# Patient Record
Sex: FEMALE | Race: OTHER | HISPANIC OR LATINO | URBAN - METROPOLITAN AREA
[De-identification: names, ages, dates, MRNs, and addresses within clinical notes are randomized per-mention and may not be internally consistent; named-entity substitution may affect disease eponyms.]

---

## 2017-04-05 ENCOUNTER — INPATIENT (INPATIENT)
Facility: HOSPITAL | Age: 52
LOS: 2 days | Discharge: HOME | DRG: 379 | End: 2017-04-08
Attending: INTERNAL MEDICINE | Admitting: INTERNAL MEDICINE
Payer: MEDICAID

## 2017-04-06 VITALS
TEMPERATURE: 98 F | SYSTOLIC BLOOD PRESSURE: 141 MMHG | WEIGHT: 177.91 LBS | OXYGEN SATURATION: 98 % | HEART RATE: 65 BPM | RESPIRATION RATE: 18 BRPM | DIASTOLIC BLOOD PRESSURE: 71 MMHG | HEIGHT: 65 IN

## 2017-04-06 DIAGNOSIS — K62.5 HEMORRHAGE OF ANUS AND RECTUM: ICD-10-CM

## 2017-04-06 DIAGNOSIS — Z29.9 ENCOUNTER FOR PROPHYLACTIC MEASURES, UNSPECIFIED: ICD-10-CM

## 2017-04-06 DIAGNOSIS — I10 ESSENTIAL (PRIMARY) HYPERTENSION: ICD-10-CM

## 2017-04-06 DIAGNOSIS — E11.9 TYPE 2 DIABETES MELLITUS WITHOUT COMPLICATIONS: ICD-10-CM

## 2017-04-06 LAB
ALBUMIN SERPL ELPH-MCNC: 3.2 G/DL — LOW (ref 3.5–5)
ALP SERPL-CCNC: 89 U/L — SIGNIFICANT CHANGE UP (ref 40–120)
ALT FLD-CCNC: 40 U/L DA — SIGNIFICANT CHANGE UP (ref 10–60)
ANION GAP SERPL CALC-SCNC: 4 MMOL/L — LOW (ref 5–17)
AST SERPL-CCNC: 19 U/L — SIGNIFICANT CHANGE UP (ref 10–40)
BASOPHILS # BLD AUTO: 0.1 K/UL — SIGNIFICANT CHANGE UP (ref 0–0.2)
BASOPHILS NFR BLD AUTO: 1 % — SIGNIFICANT CHANGE UP (ref 0–2)
BILIRUB SERPL-MCNC: 0.3 MG/DL — SIGNIFICANT CHANGE UP (ref 0.2–1.2)
BUN SERPL-MCNC: 13 MG/DL — SIGNIFICANT CHANGE UP (ref 7–18)
CALCIUM SERPL-MCNC: 8.7 MG/DL — SIGNIFICANT CHANGE UP (ref 8.4–10.5)
CHLORIDE SERPL-SCNC: 106 MMOL/L — SIGNIFICANT CHANGE UP (ref 96–108)
CO2 SERPL-SCNC: 30 MMOL/L — SIGNIFICANT CHANGE UP (ref 22–31)
CREAT SERPL-MCNC: 0.7 MG/DL — SIGNIFICANT CHANGE UP (ref 0.5–1.3)
EOSINOPHIL # BLD AUTO: 0.2 K/UL — SIGNIFICANT CHANGE UP (ref 0–0.5)
EOSINOPHIL NFR BLD AUTO: 1.3 % — SIGNIFICANT CHANGE UP (ref 0–6)
GLUCOSE SERPL-MCNC: 248 MG/DL — HIGH (ref 70–99)
HCT VFR BLD CALC: 36.7 % — SIGNIFICANT CHANGE UP (ref 34.5–45)
HCT VFR BLD CALC: 38.1 % — SIGNIFICANT CHANGE UP (ref 34.5–45)
HGB BLD-MCNC: 12.1 G/DL — SIGNIFICANT CHANGE UP (ref 11.5–15.5)
HGB BLD-MCNC: 13 G/DL — SIGNIFICANT CHANGE UP (ref 11.5–15.5)
LYMPHOCYTES # BLD AUTO: 4.8 K/UL — HIGH (ref 1–3.3)
LYMPHOCYTES # BLD AUTO: 42.3 % — SIGNIFICANT CHANGE UP (ref 13–44)
MCHC RBC-ENTMCNC: 29.7 PG — SIGNIFICANT CHANGE UP (ref 27–34)
MCHC RBC-ENTMCNC: 29.9 PG — SIGNIFICANT CHANGE UP (ref 27–34)
MCHC RBC-ENTMCNC: 33.1 GM/DL — SIGNIFICANT CHANGE UP (ref 32–36)
MCHC RBC-ENTMCNC: 34.1 GM/DL — SIGNIFICANT CHANGE UP (ref 32–36)
MCV RBC AUTO: 87.7 FL — SIGNIFICANT CHANGE UP (ref 80–100)
MCV RBC AUTO: 89.8 FL — SIGNIFICANT CHANGE UP (ref 80–100)
MONOCYTES # BLD AUTO: 0.7 K/UL — SIGNIFICANT CHANGE UP (ref 0–0.9)
MONOCYTES NFR BLD AUTO: 6.4 % — SIGNIFICANT CHANGE UP (ref 2–14)
NEUTROPHILS # BLD AUTO: 5.6 K/UL — SIGNIFICANT CHANGE UP (ref 1.8–7.4)
NEUTROPHILS NFR BLD AUTO: 49 % — SIGNIFICANT CHANGE UP (ref 43–77)
OB PNL STL: POSITIVE
PLATELET # BLD AUTO: 310 K/UL — SIGNIFICANT CHANGE UP (ref 150–400)
PLATELET # BLD AUTO: 318 K/UL — SIGNIFICANT CHANGE UP (ref 150–400)
POTASSIUM SERPL-MCNC: 4.4 MMOL/L — SIGNIFICANT CHANGE UP (ref 3.5–5.3)
POTASSIUM SERPL-SCNC: 4.4 MMOL/L — SIGNIFICANT CHANGE UP (ref 3.5–5.3)
PROT SERPL-MCNC: 7.7 G/DL — SIGNIFICANT CHANGE UP (ref 6–8.3)
RBC # BLD: 4.09 M/UL — SIGNIFICANT CHANGE UP (ref 3.8–5.2)
RBC # BLD: 4.34 M/UL — SIGNIFICANT CHANGE UP (ref 3.8–5.2)
RBC # FLD: 12.1 % — SIGNIFICANT CHANGE UP (ref 10.3–14.5)
RBC # FLD: 12.1 % — SIGNIFICANT CHANGE UP (ref 10.3–14.5)
SODIUM SERPL-SCNC: 140 MMOL/L — SIGNIFICANT CHANGE UP (ref 135–145)
WBC # BLD: 11.4 K/UL — HIGH (ref 3.8–10.5)
WBC # BLD: 9.1 K/UL — SIGNIFICANT CHANGE UP (ref 3.8–10.5)
WBC # FLD AUTO: 11.4 K/UL — HIGH (ref 3.8–10.5)
WBC # FLD AUTO: 9.1 K/UL — SIGNIFICANT CHANGE UP (ref 3.8–10.5)

## 2017-04-06 PROCEDURE — 88305 TISSUE EXAM BY PATHOLOGIST: CPT | Mod: 26

## 2017-04-06 PROCEDURE — 99285 EMERGENCY DEPT VISIT HI MDM: CPT | Mod: 25

## 2017-04-06 RX ORDER — DEXTROSE 50 % IN WATER 50 %
12.5 SYRINGE (ML) INTRAVENOUS ONCE
Qty: 0 | Refills: 0 | Status: DISCONTINUED | OUTPATIENT
Start: 2017-04-06 | End: 2017-04-08

## 2017-04-06 RX ORDER — INSULIN LISPRO 100/ML
VIAL (ML) SUBCUTANEOUS EVERY 6 HOURS
Qty: 0 | Refills: 0 | Status: DISCONTINUED | OUTPATIENT
Start: 2017-04-06 | End: 2017-04-08

## 2017-04-06 RX ORDER — DEXTROSE 50 % IN WATER 50 %
1 SYRINGE (ML) INTRAVENOUS ONCE
Qty: 0 | Refills: 0 | Status: DISCONTINUED | OUTPATIENT
Start: 2017-04-06 | End: 2017-04-08

## 2017-04-06 RX ORDER — SODIUM CHLORIDE 9 MG/ML
1000 INJECTION, SOLUTION INTRAVENOUS
Qty: 0 | Refills: 0 | Status: DISCONTINUED | OUTPATIENT
Start: 2017-04-06 | End: 2017-04-08

## 2017-04-06 RX ORDER — SOD SULF/SODIUM/NAHCO3/KCL/PEG
4000 SOLUTION, RECONSTITUTED, ORAL ORAL ONCE
Qty: 0 | Refills: 0 | Status: COMPLETED | OUTPATIENT
Start: 2017-04-06 | End: 2017-04-06

## 2017-04-06 RX ORDER — PANTOPRAZOLE SODIUM 20 MG/1
40 TABLET, DELAYED RELEASE ORAL ONCE
Qty: 0 | Refills: 0 | Status: COMPLETED | OUTPATIENT
Start: 2017-04-06 | End: 2017-04-06

## 2017-04-06 RX ORDER — PANTOPRAZOLE SODIUM 20 MG/1
40 TABLET, DELAYED RELEASE ORAL EVERY 12 HOURS
Qty: 0 | Refills: 0 | Status: DISCONTINUED | OUTPATIENT
Start: 2017-04-06 | End: 2017-04-08

## 2017-04-06 RX ORDER — DEXTROSE 50 % IN WATER 50 %
25 SYRINGE (ML) INTRAVENOUS ONCE
Qty: 0 | Refills: 0 | Status: DISCONTINUED | OUTPATIENT
Start: 2017-04-06 | End: 2017-04-08

## 2017-04-06 RX ORDER — SODIUM CHLORIDE 9 MG/ML
1000 INJECTION, SOLUTION INTRAVENOUS
Qty: 0 | Refills: 0 | Status: DISCONTINUED | OUTPATIENT
Start: 2017-04-06 | End: 2017-04-07

## 2017-04-06 RX ORDER — SODIUM CHLORIDE 9 MG/ML
1000 INJECTION INTRAMUSCULAR; INTRAVENOUS; SUBCUTANEOUS ONCE
Qty: 0 | Refills: 0 | Status: COMPLETED | OUTPATIENT
Start: 2017-04-06 | End: 2017-04-06

## 2017-04-06 RX ORDER — ONDANSETRON 8 MG/1
4 TABLET, FILM COATED ORAL EVERY 6 HOURS
Qty: 0 | Refills: 0 | Status: COMPLETED | OUTPATIENT
Start: 2017-04-06 | End: 2017-04-06

## 2017-04-06 RX ORDER — GLUCAGON INJECTION, SOLUTION 0.5 MG/.1ML
1 INJECTION, SOLUTION SUBCUTANEOUS ONCE
Qty: 0 | Refills: 0 | Status: DISCONTINUED | OUTPATIENT
Start: 2017-04-06 | End: 2017-04-08

## 2017-04-06 RX ORDER — SODIUM CHLORIDE 9 MG/ML
1000 INJECTION INTRAMUSCULAR; INTRAVENOUS; SUBCUTANEOUS
Qty: 0 | Refills: 0 | Status: DISCONTINUED | OUTPATIENT
Start: 2017-04-06 | End: 2017-04-06

## 2017-04-06 RX ADMIN — Medication 2: at 17:23

## 2017-04-06 RX ADMIN — SODIUM CHLORIDE 100 MILLILITER(S): 9 INJECTION INTRAMUSCULAR; INTRAVENOUS; SUBCUTANEOUS at 08:24

## 2017-04-06 RX ADMIN — PANTOPRAZOLE SODIUM 40 MILLIGRAM(S): 20 TABLET, DELAYED RELEASE ORAL at 06:05

## 2017-04-06 RX ADMIN — Medication 5: at 23:08

## 2017-04-06 RX ADMIN — SODIUM CHLORIDE 6000 MILLILITER(S): 9 INJECTION INTRAMUSCULAR; INTRAVENOUS; SUBCUTANEOUS at 06:08

## 2017-04-06 RX ADMIN — ONDANSETRON 4 MILLIGRAM(S): 8 TABLET, FILM COATED ORAL at 10:09

## 2017-04-06 RX ADMIN — PANTOPRAZOLE SODIUM 40 MILLIGRAM(S): 20 TABLET, DELAYED RELEASE ORAL at 17:23

## 2017-04-06 RX ADMIN — SODIUM CHLORIDE 100 MILLILITER(S): 9 INJECTION, SOLUTION INTRAVENOUS at 11:26

## 2017-04-06 RX ADMIN — Medication 4000 MILLILITER(S): at 10:06

## 2017-04-06 NOTE — H&P ADULT. - RS GEN PE MLT RESP DETAILS PC
breath sounds equal/airway patent/normal/respirations non-labored/good air movement/clear to auscultation bilaterally

## 2017-04-06 NOTE — H&P ADULT. - ASSESSMENT
52 y/o F from home, post menopausal (3 years ago) PMH of DM, HTN and HLD presented with acute onset of lower GI bleed. Patient was in her usual state yesterday. Ate out side and felt dizzy. Mouth was unusually dry which she attributed to DM. At 5pm she had a loose BM that was full of blood. Bleeding was associated with on and off cramping abdominal pain. She had another episode at 7pm then followed by 11 more times. She came to ED and had another 2 episodes. Still reports weakness and dizziness. No recent abdominal pain, nausea, vomiting, diarrhea or constipation prior to GI bleed.  Non smoker. Never had EGD or colonoscopy. Her mother has h/o polyps in the colon.

## 2017-04-06 NOTE — ED ADULT NURSE NOTE - OBJECTIVE STATEMENT
Pt co having rectal bleeding with bright red blood clots, and abdominal pain and nausea started today. denies vomiting, fever, chills, recent travel or on blood thinner.

## 2017-04-06 NOTE — H&P ADULT. - PROBLEM SELECTOR PLAN 1
- patient has bright red blood per rectum   - orthstatics negative  - she was hypotensive in the ED to 96/59mmhg and was given iv fluids with improvement in BP   - trended CBC; normal HB/HCT twice   - orthostatics negative   - patient to get colonoscopy today   - will continue iv protonix 40bid for now   - Dr Mcpherson, GI   - - patient has bright red blood per rectum   - orthstatics negative  - she was hypotensive in the ED to 96/59mmhg and was given iv fluids with improvement in BP   - trended CBC; normal HB/HCT twice   - orthostatics negative   - patient to get colonoscopy today   - will continue iv protonix 40bid for now   - Dr Mcpherson, GI   -ADDENDUM: patient has colonoscopy that showed polyps in the colon with a large polyp concerning for carcinoma in situ.   - will start patient on clears   - will get CT abdomen and pelvis with iv and oral contrast as per GI   - patient to f/u with GI in 3 months for repeat colonoscopy.  - BT consent to be takes once patient is out of recovery (4 hours post anaesthesia phase; however if urgently needed; daughter can be reached at [phone]) - patient has bright red blood per rectum   - orthstatics negative  - she was hypotensive in the ED to 96/59mmhg and was given iv fluids with improvement in BP   - trended CBC; normal HB/HCT twice   - orthostatics negative   - patient to get colonoscopy today   - will continue iv protonix 40bid for now   - Dr Mcpherson, GI   -ADDENDUM: patient has colonoscopy that showed polyps in the colon with a large polyp concerning for carcinoma in situ.   - will start patient on clears   - will get CT abdomen and pelvis with iv and oral contrast as per GI   - patient to f/u with GI in 3 months for repeat colonoscopy.  - BT consent to be takes once patient is out of recovery (4 hours post anaesthesia phase; however if urgently needed; daughter can be reached at [phone])  - transfuse if Hb<9

## 2017-04-06 NOTE — H&P ADULT. - HISTORY OF PRESENT ILLNESS
52 y/o F from home, post menopausal (3 years ago) PMH of DM, HTN and HLD presented with acute onset of lower GI bleed. Patient was in her usual state yesterday. Ate out side and felt dizzy. Mouth was unusually dry which she attributed to DM. At 5pm she had a loose BM that was full of blood. Bleeding was associated with on and off cramping abdominal pain. She had another episode at 7pm then followed by 11 more times. She came to ED and had another 2 episodes. Still reports weakness and dizziness. No recent abdominal pain, nausea, vomiting, diarrhea or constipation prior to GI bleed. 52 y/o F from home, post menopausal (3 years ago) PMH of DM, HTN and HLD presented with acute onset of lower GI bleed. Patient was in her usual state yesterday. Ate out side and felt dizzy. Mouth was unusually dry which she attributed to DM. At 5pm she had a loose BM that was full of blood. Bleeding was associated with on and off cramping abdominal pain. She had another episode at 7pm then followed by 11 more times. She came to ED and had another 2 episodes. Still reports weakness and dizziness. No recent abdominal pain, nausea, vomiting, diarrhea or constipation prior to GI bleed.  Non smoker. Never had EGD or colonoscopy. Her mother has h/o polyps in the colon.

## 2017-04-06 NOTE — ED PROVIDER NOTE - OBJECTIVE STATEMENT
rectal bleeding  51 year old with hx of htn and dm complaining of BRBPR 7 times tonight.  abd cramping with each BM  has not had a colonstomy.  no fever no chlls no nausea or voming,  has hs of c section, ectopic pregnancy, hernia and gastritis.  not taking any blood thinners

## 2017-04-07 LAB
ALBUMIN SERPL ELPH-MCNC: 2.9 G/DL — LOW (ref 3.5–5)
ALP SERPL-CCNC: 81 U/L — SIGNIFICANT CHANGE UP (ref 40–120)
ALT FLD-CCNC: 31 U/L DA — SIGNIFICANT CHANGE UP (ref 10–60)
ANION GAP SERPL CALC-SCNC: 9 MMOL/L — SIGNIFICANT CHANGE UP (ref 5–17)
APPEARANCE UR: CLEAR — SIGNIFICANT CHANGE UP
AST SERPL-CCNC: 17 U/L — SIGNIFICANT CHANGE UP (ref 10–40)
BASOPHILS # BLD AUTO: 0 K/UL — SIGNIFICANT CHANGE UP (ref 0–0.2)
BASOPHILS NFR BLD AUTO: 0.4 % — SIGNIFICANT CHANGE UP (ref 0–2)
BILIRUB SERPL-MCNC: 0.2 MG/DL — SIGNIFICANT CHANGE UP (ref 0.2–1.2)
BILIRUB UR-MCNC: NEGATIVE — SIGNIFICANT CHANGE UP
BUN SERPL-MCNC: 7 MG/DL — SIGNIFICANT CHANGE UP (ref 7–18)
CALCIUM SERPL-MCNC: 8 MG/DL — LOW (ref 8.4–10.5)
CHLORIDE SERPL-SCNC: 108 MMOL/L — SIGNIFICANT CHANGE UP (ref 96–108)
CO2 SERPL-SCNC: 26 MMOL/L — SIGNIFICANT CHANGE UP (ref 22–31)
COLOR SPEC: YELLOW — SIGNIFICANT CHANGE UP
CREAT SERPL-MCNC: 0.74 MG/DL — SIGNIFICANT CHANGE UP (ref 0.5–1.3)
DIFF PNL FLD: ABNORMAL
EOSINOPHIL # BLD AUTO: 0.2 K/UL — SIGNIFICANT CHANGE UP (ref 0–0.5)
EOSINOPHIL NFR BLD AUTO: 1.1 % — SIGNIFICANT CHANGE UP (ref 0–6)
FERRITIN SERPL-MCNC: 40.6 NG/ML — SIGNIFICANT CHANGE UP (ref 15–150)
GLUCOSE SERPL-MCNC: 289 MG/DL — HIGH (ref 70–99)
GLUCOSE UR QL: 250
HBA1C BLD-MCNC: 11.3 % — HIGH (ref 4–5.6)
HCT VFR BLD CALC: 33 % — LOW (ref 34.5–45)
HCT VFR BLD CALC: 33.7 % — LOW (ref 34.5–45)
HGB BLD-MCNC: 11 G/DL — LOW (ref 11.5–15.5)
HGB BLD-MCNC: 11.2 G/DL — LOW (ref 11.5–15.5)
IRON SATN MFR SERPL: 14 % — LOW (ref 15–50)
IRON SATN MFR SERPL: 37 UG/DL — LOW (ref 40–150)
KETONES UR-MCNC: NEGATIVE — SIGNIFICANT CHANGE UP
LEUKOCYTE ESTERASE UR-ACNC: NEGATIVE — SIGNIFICANT CHANGE UP
LYMPHOCYTES # BLD AUTO: 22.6 % — SIGNIFICANT CHANGE UP (ref 13–44)
LYMPHOCYTES # BLD AUTO: 3.1 K/UL — SIGNIFICANT CHANGE UP (ref 1–3.3)
MAGNESIUM SERPL-MCNC: 1.6 MG/DL — LOW (ref 1.8–2.4)
MCHC RBC-ENTMCNC: 29.6 PG — SIGNIFICANT CHANGE UP (ref 27–34)
MCHC RBC-ENTMCNC: 30.1 PG — SIGNIFICANT CHANGE UP (ref 27–34)
MCHC RBC-ENTMCNC: 33.2 GM/DL — SIGNIFICANT CHANGE UP (ref 32–36)
MCHC RBC-ENTMCNC: 33.3 GM/DL — SIGNIFICANT CHANGE UP (ref 32–36)
MCV RBC AUTO: 89 FL — SIGNIFICANT CHANGE UP (ref 80–100)
MCV RBC AUTO: 90.5 FL — SIGNIFICANT CHANGE UP (ref 80–100)
MONOCYTES # BLD AUTO: 1 K/UL — HIGH (ref 0–0.9)
MONOCYTES NFR BLD AUTO: 7.1 % — SIGNIFICANT CHANGE UP (ref 2–14)
NEUTROPHILS # BLD AUTO: 9.5 K/UL — HIGH (ref 1.8–7.4)
NEUTROPHILS NFR BLD AUTO: 68.8 % — SIGNIFICANT CHANGE UP (ref 43–77)
NITRITE UR-MCNC: NEGATIVE — SIGNIFICANT CHANGE UP
PH UR: 7 — SIGNIFICANT CHANGE UP (ref 4.8–8)
PHOSPHATE SERPL-MCNC: 2.4 MG/DL — LOW (ref 2.5–4.5)
PLATELET # BLD AUTO: 275 K/UL — SIGNIFICANT CHANGE UP (ref 150–400)
PLATELET # BLD AUTO: 283 K/UL — SIGNIFICANT CHANGE UP (ref 150–400)
POTASSIUM SERPL-MCNC: 3.7 MMOL/L — SIGNIFICANT CHANGE UP (ref 3.5–5.3)
POTASSIUM SERPL-SCNC: 3.7 MMOL/L — SIGNIFICANT CHANGE UP (ref 3.5–5.3)
PROT SERPL-MCNC: 6.6 G/DL — SIGNIFICANT CHANGE UP (ref 6–8.3)
PROT UR-MCNC: NEGATIVE — SIGNIFICANT CHANGE UP
RBC # BLD: 3.64 M/UL — LOW (ref 3.8–5.2)
RBC # BLD: 3.78 M/UL — LOW (ref 3.8–5.2)
RBC # FLD: 12.2 % — SIGNIFICANT CHANGE UP (ref 10.3–14.5)
RBC # FLD: 12.2 % — SIGNIFICANT CHANGE UP (ref 10.3–14.5)
SODIUM SERPL-SCNC: 143 MMOL/L — SIGNIFICANT CHANGE UP (ref 135–145)
SP GR SPEC: 1 — LOW (ref 1.01–1.02)
TIBC SERPL-MCNC: 275 UG/DL — SIGNIFICANT CHANGE UP (ref 250–450)
UIBC SERPL-MCNC: 238 UG/DL — SIGNIFICANT CHANGE UP (ref 110–370)
UROBILINOGEN FLD QL: NEGATIVE — SIGNIFICANT CHANGE UP
WBC # BLD: 13.8 K/UL — HIGH (ref 3.8–10.5)
WBC # BLD: 16.7 K/UL — HIGH (ref 3.8–10.5)
WBC # FLD AUTO: 13.8 K/UL — HIGH (ref 3.8–10.5)
WBC # FLD AUTO: 16.7 K/UL — HIGH (ref 3.8–10.5)

## 2017-04-07 PROCEDURE — 74177 CT ABD & PELVIS W/CONTRAST: CPT | Mod: 26

## 2017-04-07 PROCEDURE — 71010: CPT | Mod: 26

## 2017-04-07 RX ORDER — INSULIN LISPRO 100/ML
3 VIAL (ML) SUBCUTANEOUS
Qty: 0 | Refills: 0 | Status: DISCONTINUED | OUTPATIENT
Start: 2017-04-07 | End: 2017-04-08

## 2017-04-07 RX ORDER — PANTOPRAZOLE SODIUM 20 MG/1
1 TABLET, DELAYED RELEASE ORAL
Qty: 30 | Refills: 0 | OUTPATIENT
Start: 2017-04-07 | End: 2017-05-07

## 2017-04-07 RX ORDER — METRONIDAZOLE 500 MG
TABLET ORAL
Qty: 0 | Refills: 0 | Status: DISCONTINUED | OUTPATIENT
Start: 2017-04-07 | End: 2017-04-08

## 2017-04-07 RX ORDER — CIPROFLOXACIN LACTATE 400MG/40ML
400 VIAL (ML) INTRAVENOUS ONCE
Qty: 0 | Refills: 0 | Status: COMPLETED | OUTPATIENT
Start: 2017-04-07 | End: 2017-04-07

## 2017-04-07 RX ORDER — METRONIDAZOLE 500 MG
500 TABLET ORAL EVERY 8 HOURS
Qty: 0 | Refills: 0 | Status: DISCONTINUED | OUTPATIENT
Start: 2017-04-08 | End: 2017-04-08

## 2017-04-07 RX ORDER — INSULIN GLARGINE 100 [IU]/ML
10 INJECTION, SOLUTION SUBCUTANEOUS AT BEDTIME
Qty: 0 | Refills: 0 | Status: DISCONTINUED | OUTPATIENT
Start: 2017-04-07 | End: 2017-04-08

## 2017-04-07 RX ORDER — LOSARTAN POTASSIUM 100 MG/1
100 TABLET, FILM COATED ORAL DAILY
Qty: 0 | Refills: 0 | Status: DISCONTINUED | OUTPATIENT
Start: 2017-04-07 | End: 2017-04-08

## 2017-04-07 RX ORDER — ACETAMINOPHEN 500 MG
650 TABLET ORAL EVERY 6 HOURS
Qty: 0 | Refills: 0 | Status: DISCONTINUED | OUTPATIENT
Start: 2017-04-07 | End: 2017-04-08

## 2017-04-07 RX ORDER — MAGNESIUM SULFATE 500 MG/ML
1 VIAL (ML) INJECTION ONCE
Qty: 0 | Refills: 0 | Status: COMPLETED | OUTPATIENT
Start: 2017-04-07 | End: 2017-04-07

## 2017-04-07 RX ORDER — METOPROLOL TARTRATE 50 MG
50 TABLET ORAL
Qty: 0 | Refills: 0 | Status: DISCONTINUED | OUTPATIENT
Start: 2017-04-07 | End: 2017-04-07

## 2017-04-07 RX ORDER — SODIUM CHLORIDE 9 MG/ML
1000 INJECTION INTRAMUSCULAR; INTRAVENOUS; SUBCUTANEOUS
Qty: 0 | Refills: 0 | Status: DISCONTINUED | OUTPATIENT
Start: 2017-04-07 | End: 2017-04-08

## 2017-04-07 RX ORDER — METRONIDAZOLE 500 MG
500 TABLET ORAL ONCE
Qty: 0 | Refills: 0 | Status: COMPLETED | OUTPATIENT
Start: 2017-04-07 | End: 2017-04-07

## 2017-04-07 RX ORDER — CIPROFLOXACIN LACTATE 400MG/40ML
VIAL (ML) INTRAVENOUS
Qty: 0 | Refills: 0 | Status: DISCONTINUED | OUTPATIENT
Start: 2017-04-07 | End: 2017-04-08

## 2017-04-07 RX ORDER — CIPROFLOXACIN LACTATE 400MG/40ML
400 VIAL (ML) INTRAVENOUS EVERY 12 HOURS
Qty: 0 | Refills: 0 | Status: DISCONTINUED | OUTPATIENT
Start: 2017-04-08 | End: 2017-04-08

## 2017-04-07 RX ORDER — SODIUM,POTASSIUM PHOSPHATES 278-250MG
1 POWDER IN PACKET (EA) ORAL ONCE
Qty: 0 | Refills: 0 | Status: COMPLETED | OUTPATIENT
Start: 2017-04-07 | End: 2017-04-07

## 2017-04-07 RX ORDER — METOPROLOL TARTRATE 50 MG
50 TABLET ORAL
Qty: 0 | Refills: 0 | Status: DISCONTINUED | OUTPATIENT
Start: 2017-04-07 | End: 2017-04-08

## 2017-04-07 RX ADMIN — Medication 3 UNIT(S): at 18:15

## 2017-04-07 RX ADMIN — Medication 650 MILLIGRAM(S): at 07:06

## 2017-04-07 RX ADMIN — Medication 1 TABLET(S): at 10:48

## 2017-04-07 RX ADMIN — Medication 100 GRAM(S): at 10:47

## 2017-04-07 RX ADMIN — Medication 3: at 06:15

## 2017-04-07 RX ADMIN — Medication 650 MILLIGRAM(S): at 06:16

## 2017-04-07 RX ADMIN — Medication 50 MILLIGRAM(S): at 10:02

## 2017-04-07 RX ADMIN — INSULIN GLARGINE 10 UNIT(S): 100 INJECTION, SOLUTION SUBCUTANEOUS at 21:51

## 2017-04-07 RX ADMIN — Medication 4: at 18:29

## 2017-04-07 RX ADMIN — PANTOPRAZOLE SODIUM 40 MILLIGRAM(S): 20 TABLET, DELAYED RELEASE ORAL at 06:15

## 2017-04-07 RX ADMIN — SODIUM CHLORIDE 100 MILLILITER(S): 9 INJECTION, SOLUTION INTRAVENOUS at 06:15

## 2017-04-07 RX ADMIN — Medication 100 MILLIGRAM(S): at 18:30

## 2017-04-07 RX ADMIN — Medication 50 MILLIGRAM(S): at 18:30

## 2017-04-07 RX ADMIN — Medication 650 MILLIGRAM(S): at 21:11

## 2017-04-07 RX ADMIN — PANTOPRAZOLE SODIUM 40 MILLIGRAM(S): 20 TABLET, DELAYED RELEASE ORAL at 18:30

## 2017-04-07 RX ADMIN — Medication 3: at 13:16

## 2017-04-07 RX ADMIN — SODIUM CHLORIDE 100 MILLILITER(S): 9 INJECTION INTRAMUSCULAR; INTRAVENOUS; SUBCUTANEOUS at 18:24

## 2017-04-07 RX ADMIN — Medication 200 MILLIGRAM(S): at 18:30

## 2017-04-07 RX ADMIN — SODIUM CHLORIDE 100 MILLILITER(S): 9 INJECTION, SOLUTION INTRAVENOUS at 03:10

## 2017-04-07 RX ADMIN — Medication 650 MILLIGRAM(S): at 21:52

## 2017-04-07 NOTE — DISCHARGE NOTE ADULT - CARE PLAN
Principal Discharge DX:	BRBPR (bright red blood per rectum)  Goal:	prevent future epsiodes  Instructions for follow-up, activity and diet:	follow up with gastroenterologist Dr. Mcpherson in 1 week for colonic polyp biopsy results  recommend repeat colonoscopy in 3 months for follow up  Secondary Diagnosis:	Type 2 diabetes mellitus with complication, without long-term current use of insulin  Instructions for follow-up, activity and diet:	on current admission your HbA1C level was 11   follow up with endocrinologist in 1 week for medications adjustment and in 3 months for repeat HbA1C  Secondary Diagnosis:	Essential hypertension  Instructions for follow-up, activity and diet:	continue with metoprolol 25mg BID  continue with losartan 100mg daily Principal Discharge DX:	BRBPR (bright red blood per rectum)  Goal:	prevent future epsiodes  Instructions for follow-up, activity and diet:	follow up with gastroenterologist Dr. Mcpherson in 1 week for colonic polyp biopsy results  recommend repeat colonoscopy in 3 months for follow up  Take 4 more days of ciprofloxacin and flagyl as post colonoscopy  Secondary Diagnosis:	Type 2 diabetes mellitus with complication, without long-term current use of insulin  Instructions for follow-up, activity and diet:	on current admission your HbA1C level was 11   follow up with endocrinologist in 1 week for medications adjustment and in 3 months for repeat HbA1C  Secondary Diagnosis:	Essential hypertension  Instructions for follow-up, activity and diet:	continue with metoprolol 25mg BID  continue with losartan 100mg daily Principal Discharge DX:	BRBPR (bright red blood per rectum)  Goal:	prevent future epsiodes  Instructions for follow-up, activity and diet:	follow up with gastroenterologist Dr. Mcpherson in 1 week for colonic polyp biopsy results  recommend repeat colonoscopy in 3 months for follow up  Take 4 more days of ciprofloxacin and flagyl as post colonoscopy prophylaxis  Secondary Diagnosis:	Type 2 diabetes mellitus with complication, without long-term current use of insulin  Instructions for follow-up, activity and diet:	on current admission your HbA1C level was 11. Medications were adjusted slightly but patient is recommended to follow up with endocrinologist within 1 week for medications adjustment and in 3 months for repeat HbA1C  Secondary Diagnosis:	Essential hypertension  Instructions for follow-up, activity and diet:	continue with metoprolol 25mg BID  continue with losartan 100mg daily

## 2017-04-07 NOTE — DISCHARGE NOTE ADULT - MEDICATION SUMMARY - MEDICATIONS TO TAKE
I will START or STAY ON the medications listed below when I get home from the hospital:    metroNIDAZOLE 500 mg oral tablet  -- 1 tab(s) by mouth every 8 hours  -- Indication: For Antibiotic prophylaxis    losartan 100 mg oral tablet  -- 1 tab(s) by mouth once a day  -- Indication: For Hypertension    Januvia 100 mg oral tablet  -- 1 tab(s) by mouth once a day  -- Indication: For Diabetes    metFORMIN 1000 mg oral tablet  -- 1 tab(s) by mouth 2 times a day  -- Check with your doctor before becoming pregnant.  Do not drink alcoholic beverages when taking this medication.  It is very important that you take or use this exactly as directed.  Do not skip doses or discontinue unless directed by your doctor.  Obtain medical advice before taking any non-prescription drugs as some may affect the action of this medication.  Take with food or milk.    -- Indication: For Diabetes    atorvastatin 20 mg oral tablet  -- 1 tab(s) by mouth once a day  -- Indication: For Diabetes    metoprolol tartrate 50 mg oral tablet  -- 1 tab(s) by mouth 2 times a day  -- Indication: For Hypertension    pantoprazole 40 mg oral delayed release tablet  -- 1 tab(s) by mouth once a day  -- Indication: For Prophylactic measure    ciprofloxacin 500 mg oral tablet  -- 1 tab(s) by mouth every 12 hours  -- Indication: For Antibiotic prophylaxis

## 2017-04-07 NOTE — DISCHARGE NOTE ADULT - SECONDARY DIAGNOSIS.
Type 2 diabetes mellitus with complication, without long-term current use of insulin Essential hypertension

## 2017-04-07 NOTE — DISCHARGE NOTE ADULT - PLAN OF CARE
prevent future epsiodes follow up with gastroenterologist Dr. Mcpherson in 1 week for colonic polyp biopsy results  recommend repeat colonoscopy in 3 months for follow up on current admission your HbA1C level was 11   follow up with endocrinologist in 1 week for medications adjustment and in 3 months for repeat HbA1C continue with metoprolol 25mg BID  continue with losartan 100mg daily follow up with gastroenterologist Dr. Mcpherson in 1 week for colonic polyp biopsy results  recommend repeat colonoscopy in 3 months for follow up  Take 4 more days of ciprofloxacin and flagyl as post colonoscopy on current admission your HbA1C level was 11. Medications were adjusted slightly but patient is recommended to follow up with endocrinologist within 1 week for medications adjustment and in 3 months for repeat HbA1C follow up with gastroenterologist Dr. Mcpherson in 1 week for colonic polyp biopsy results  recommend repeat colonoscopy in 3 months for follow up  Take 4 more days of ciprofloxacin and flagyl as post colonoscopy prophylaxis

## 2017-04-07 NOTE — DISCHARGE NOTE ADULT - HOSPITAL COURSE
52 y/o F from home, post menopausal (3 years ago) PMH of DM, HTN and HLD. Pt denies smoking or getting prior EGD or colonoscopy. Reported family hx of colonic h/o polyps in the colon (mother).Patient was admitted due to lower GI bleed, associated with on and off cramping abdominal pain. She had about 13 episodes of bleeding in her stool.  On admission pt was orthostatics negative, hypotensive in the ED to 96/59mmhg with improvement after IVF (1L bolus). 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 CT abdomen and pelvis showing fat stranding surrounding area of bx, possible inflammation from procedure On discharge patient was adviced to f/u with GI in for biopsy results in 1 week and in 3 months for repeat colonoscopy. GI consulted Dr. Mcpherson     Pt also developed leukocytosis, WBC increased from 13.9->16.7. UA negative and CXR showing no acute process. Blood Cx growing.. Repeat CBC with diff in AM showing..     Pt was found to have uncontrolled DM. Pt takes Metformin 850mg twice a day was held as patient got contrast for CT abdomen, she also takes Januvia 100mg daily BSL ranged from 200-300 over past 24hrs. Pt was started on Humalog 3U pre meal and Lantus 10U at HS HbA1C level of 11. Endocrinology consulted Dr. Guerra recommended,,.     Pt has hx of essential hypertension and was restarted on Metoprolol home dose 25mg twice a day and losartan 100mg daily with parameters 50 y/o F from home, post menopausal (3 years ago) PMH of DM, HTN and HLD. Pt denies smoking or getting prior EGD or colonoscopy. Reported family hx of colonic h/o polyps in the colon (mother).Patient was admitted due to lower GI bleed, associated with on and off cramping abdominal pain. She had about 13 episodes of bleeding in her stool.  On admission pt was orthostatics negative, hypotensive in the ED to 96/59mmhg with improvement after IVF (1L bolus). 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 CT abdomen and pelvis showing fat stranding surrounding area of bx, possible inflammation from procedure On discharge patient was adviced to f/u with GI in for biopsy results in 1 week and in 3 months for repeat colonoscopy. GI consulted Dr. Mcpherson     Pt also developed leukocytosis, WBC increased from 13.9->16.7. UA negative and CXR showing no acute process. Blood Cx growing.. Repeat CBC with diff in AM showing decreased white count likely secondary to reactive leukocytosis from procedure    Pt was found to have uncontrolled DM. Pt takes Metformin 850mg twice a day was held as patient got contrast for CT abdomen, she also takes Januvia 100mg daily BSL ranged from 200-300 over past 24hrs. Pt was started on Humalog 3U pre meal and Lantus 10U at HS HbA1C level of 11. Endocrinology consulted Dr. Guerra recommended,,.     Pt has hx of essential hypertension and was restarted on Metoprolol home dose 25mg twice a day and losartan 100mg daily with parameters 50 y/o F from home, post menopausal (3 years ago) PMH of DM, HTN and HLD. Pt denies smoking or getting prior EGD or colonoscopy. Reported family hx of colonic h/o polyps in the colon (mother).Patient was admitted due to lower GI bleed, associated with on and off cramping abdominal pain. She had about 13 episodes of bleeding in her stool.  On admission pt was orthostatics negative, hypotensive in the ED to 96/59mmhg with improvement after IVF (1L bolus). 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 CT abdomen and pelvis showing fat stranding surrounding area of bx, possible inflammation from procedure On discharge patient was adviced to f/u with GI in for biopsy results in 1 week and in 3 months for repeat colonoscopy. GI consult: Dr. Mcpherson. Patient given Ciprofloxacin and Flagyl for 5 days for post polypectomy antibiotic prophylaxis.    Pt also developed leukocytosis, WBC increased from 13.9->16.7. UA negative and CXR showing no acute process. Blood Cx growing.. Repeat CBC with diff in AM showing decreased white count likely secondary to reactive leukocytosis from procedure    Pt was found to have uncontrolled DM. Patient's diabetic medications were adjusted but patient is strongly recommended to follow up with PCP/endocrinologist for re-evaluation of diabetes and change in medication regimen.    Pt has hx of essential hypertension and was restarted on Metoprolol home dose 25mg twice a day and losartan 100mg daily with parameters

## 2017-04-07 NOTE — DISCHARGE NOTE ADULT - MEDICATION SUMMARY - MEDICATIONS TO CHANGE
I will SWITCH the dose or number of times a day I take the medications listed below when I get home from the hospital:    metFORMIN 850 mg oral tablet  -- 1 tab(s) by mouth 2 times a day

## 2017-04-07 NOTE — DISCHARGE NOTE ADULT - CARE PROVIDER_API CALL
Og Mcpherson), Internal Medicine  [id number] [place] [address]  Phone: [phone]  Fax: [phone]

## 2017-04-07 NOTE — DISCHARGE NOTE ADULT - PATIENT PORTAL LINK FT
“You can access the FollowHealth Patient Portal, offered by [facility], by registering with the following website: [email or web address]”

## 2017-04-08 VITALS
RESPIRATION RATE: 16 BRPM | DIASTOLIC BLOOD PRESSURE: 77 MMHG | TEMPERATURE: 98 F | SYSTOLIC BLOOD PRESSURE: 162 MMHG | HEART RATE: 72 BPM | OXYGEN SATURATION: 98 %

## 2017-04-08 LAB
ALBUMIN SERPL ELPH-MCNC: 2.8 G/DL — LOW (ref 3.5–5)
ALP SERPL-CCNC: 80 U/L — SIGNIFICANT CHANGE UP (ref 40–120)
ALT FLD-CCNC: 26 U/L DA — SIGNIFICANT CHANGE UP (ref 10–60)
ANION GAP SERPL CALC-SCNC: 4 MMOL/L — LOW (ref 5–17)
AST SERPL-CCNC: 12 U/L — SIGNIFICANT CHANGE UP (ref 10–40)
BASOPHILS # BLD AUTO: 0.1 K/UL — SIGNIFICANT CHANGE UP (ref 0–0.2)
BASOPHILS NFR BLD AUTO: 0.8 % — SIGNIFICANT CHANGE UP (ref 0–2)
BILIRUB SERPL-MCNC: 0.3 MG/DL — SIGNIFICANT CHANGE UP (ref 0.2–1.2)
BUN SERPL-MCNC: 7 MG/DL — SIGNIFICANT CHANGE UP (ref 7–18)
CALCIUM SERPL-MCNC: 8.3 MG/DL — LOW (ref 8.4–10.5)
CHLORIDE SERPL-SCNC: 110 MMOL/L — HIGH (ref 96–108)
CO2 SERPL-SCNC: 28 MMOL/L — SIGNIFICANT CHANGE UP (ref 22–31)
CREAT SERPL-MCNC: 0.59 MG/DL — SIGNIFICANT CHANGE UP (ref 0.5–1.3)
EOSINOPHIL # BLD AUTO: 0.2 K/UL — SIGNIFICANT CHANGE UP (ref 0–0.5)
EOSINOPHIL NFR BLD AUTO: 1.7 % — SIGNIFICANT CHANGE UP (ref 0–6)
GLUCOSE SERPL-MCNC: 279 MG/DL — HIGH (ref 70–99)
HCT VFR BLD CALC: 31.8 % — LOW (ref 34.5–45)
HGB BLD-MCNC: 10.6 G/DL — LOW (ref 11.5–15.5)
LACTATE SERPL-SCNC: 1.1 MMOL/L — SIGNIFICANT CHANGE UP (ref 0.7–2)
LYMPHOCYTES # BLD AUTO: 3.1 K/UL — SIGNIFICANT CHANGE UP (ref 1–3.3)
LYMPHOCYTES # BLD AUTO: 33.1 % — SIGNIFICANT CHANGE UP (ref 13–44)
MAGNESIUM SERPL-MCNC: 1.8 MG/DL — SIGNIFICANT CHANGE UP (ref 1.8–2.4)
MCHC RBC-ENTMCNC: 29.8 PG — SIGNIFICANT CHANGE UP (ref 27–34)
MCHC RBC-ENTMCNC: 33.4 GM/DL — SIGNIFICANT CHANGE UP (ref 32–36)
MCV RBC AUTO: 89.5 FL — SIGNIFICANT CHANGE UP (ref 80–100)
MONOCYTES # BLD AUTO: 0.6 K/UL — SIGNIFICANT CHANGE UP (ref 0–0.9)
MONOCYTES NFR BLD AUTO: 6.5 % — SIGNIFICANT CHANGE UP (ref 2–14)
NEUTROPHILS # BLD AUTO: 5.4 K/UL — SIGNIFICANT CHANGE UP (ref 1.8–7.4)
NEUTROPHILS NFR BLD AUTO: 57.9 % — SIGNIFICANT CHANGE UP (ref 43–77)
PHOSPHATE SERPL-MCNC: 2.8 MG/DL — SIGNIFICANT CHANGE UP (ref 2.5–4.5)
PLATELET # BLD AUTO: 258 K/UL — SIGNIFICANT CHANGE UP (ref 150–400)
POTASSIUM SERPL-MCNC: 3.7 MMOL/L — SIGNIFICANT CHANGE UP (ref 3.5–5.3)
POTASSIUM SERPL-SCNC: 3.7 MMOL/L — SIGNIFICANT CHANGE UP (ref 3.5–5.3)
PROT SERPL-MCNC: 7.1 G/DL — SIGNIFICANT CHANGE UP (ref 6–8.3)
RBC # BLD: 3.56 M/UL — LOW (ref 3.8–5.2)
RBC # FLD: 12.5 % — SIGNIFICANT CHANGE UP (ref 10.3–14.5)
SODIUM SERPL-SCNC: 142 MMOL/L — SIGNIFICANT CHANGE UP (ref 135–145)
WBC # BLD: 9.3 K/UL — SIGNIFICANT CHANGE UP (ref 3.8–10.5)
WBC # FLD AUTO: 9.3 K/UL — SIGNIFICANT CHANGE UP (ref 3.8–10.5)

## 2017-04-08 PROCEDURE — 74177 CT ABD & PELVIS W/CONTRAST: CPT | Mod: 26

## 2017-04-08 RX ORDER — METOPROLOL TARTRATE 50 MG
1 TABLET ORAL
Qty: 0 | Refills: 0 | COMMUNITY

## 2017-04-08 RX ORDER — CIPROFLOXACIN LACTATE 400MG/40ML
1 VIAL (ML) INTRAVENOUS
Qty: 8 | Refills: 0 | OUTPATIENT
Start: 2017-04-08 | End: 2017-04-12

## 2017-04-08 RX ORDER — METFORMIN HYDROCHLORIDE 850 MG/1
1 TABLET ORAL
Qty: 0 | Refills: 0 | COMMUNITY

## 2017-04-08 RX ORDER — SITAGLIPTIN 50 MG/1
1 TABLET, FILM COATED ORAL
Qty: 30 | Refills: 0 | OUTPATIENT
Start: 2017-04-08 | End: 2017-05-08

## 2017-04-08 RX ORDER — ATORVASTATIN CALCIUM 80 MG/1
1 TABLET, FILM COATED ORAL
Qty: 0 | Refills: 0 | COMMUNITY

## 2017-04-08 RX ORDER — METFORMIN HYDROCHLORIDE 850 MG/1
1 TABLET ORAL
Qty: 60 | Refills: 0 | OUTPATIENT
Start: 2017-04-08 | End: 2017-05-08

## 2017-04-08 RX ORDER — METRONIDAZOLE 500 MG
1 TABLET ORAL
Qty: 12 | Refills: 0 | OUTPATIENT
Start: 2017-04-08 | End: 2017-04-12

## 2017-04-08 RX ORDER — SITAGLIPTIN 50 MG/1
1 TABLET, FILM COATED ORAL
Qty: 0 | Refills: 0 | COMMUNITY

## 2017-04-08 RX ORDER — LOSARTAN POTASSIUM 100 MG/1
1 TABLET, FILM COATED ORAL
Qty: 0 | Refills: 0 | COMMUNITY

## 2017-04-08 RX ADMIN — LOSARTAN POTASSIUM 100 MILLIGRAM(S): 100 TABLET, FILM COATED ORAL at 05:38

## 2017-04-08 RX ADMIN — Medication 3: at 11:17

## 2017-04-08 RX ADMIN — Medication 200 MILLIGRAM(S): at 06:00

## 2017-04-08 RX ADMIN — Medication 3 UNIT(S): at 11:17

## 2017-04-08 RX ADMIN — Medication 50 MILLIGRAM(S): at 06:31

## 2017-04-08 RX ADMIN — Medication 3: at 06:21

## 2017-04-08 RX ADMIN — Medication 100 MILLIGRAM(S): at 06:55

## 2017-04-08 RX ADMIN — SODIUM CHLORIDE 100 MILLILITER(S): 9 INJECTION INTRAMUSCULAR; INTRAVENOUS; SUBCUTANEOUS at 00:10

## 2017-04-08 RX ADMIN — Medication 4: at 00:28

## 2017-04-08 RX ADMIN — PANTOPRAZOLE SODIUM 40 MILLIGRAM(S): 20 TABLET, DELAYED RELEASE ORAL at 05:38

## 2017-04-11 DIAGNOSIS — E11.65 TYPE 2 DIABETES MELLITUS WITH HYPERGLYCEMIA: ICD-10-CM

## 2017-04-11 DIAGNOSIS — K92.2 GASTROINTESTINAL HEMORRHAGE, UNSPECIFIED: ICD-10-CM

## 2017-04-11 DIAGNOSIS — Z88.0 ALLERGY STATUS TO PENICILLIN: ICD-10-CM

## 2017-04-11 DIAGNOSIS — I10 ESSENTIAL (PRIMARY) HYPERTENSION: ICD-10-CM

## 2017-04-11 DIAGNOSIS — E78.5 HYPERLIPIDEMIA, UNSPECIFIED: ICD-10-CM

## 2017-04-11 DIAGNOSIS — D72.829 ELEVATED WHITE BLOOD CELL COUNT, UNSPECIFIED: ICD-10-CM

## 2017-04-13 DIAGNOSIS — D12.3 BENIGN NEOPLASM OF TRANSVERSE COLON: ICD-10-CM

## 2017-04-13 DIAGNOSIS — D12.2 BENIGN NEOPLASM OF ASCENDING COLON: ICD-10-CM

## 2017-04-13 DIAGNOSIS — D12.4 BENIGN NEOPLASM OF DESCENDING COLON: ICD-10-CM

## 2017-04-13 LAB
CULTURE RESULTS: SIGNIFICANT CHANGE UP
SPECIMEN SOURCE: SIGNIFICANT CHANGE UP

## 2017-07-23 PROCEDURE — 86850 RBC ANTIBODY SCREEN: CPT

## 2017-07-23 PROCEDURE — 86901 BLOOD TYPING SEROLOGIC RH(D): CPT

## 2017-07-23 PROCEDURE — 71045 X-RAY EXAM CHEST 1 VIEW: CPT

## 2017-07-23 PROCEDURE — 88305 TISSUE EXAM BY PATHOLOGIST: CPT

## 2017-07-23 PROCEDURE — 83605 ASSAY OF LACTIC ACID: CPT

## 2017-07-23 PROCEDURE — 74177 CT ABD & PELVIS W/CONTRAST: CPT

## 2017-07-23 PROCEDURE — 83735 ASSAY OF MAGNESIUM: CPT

## 2017-07-23 PROCEDURE — 86900 BLOOD TYPING SEROLOGIC ABO: CPT

## 2017-07-23 PROCEDURE — 82272 OCCULT BLD FECES 1-3 TESTS: CPT

## 2017-07-23 PROCEDURE — 99285 EMERGENCY DEPT VISIT HI MDM: CPT | Mod: 25

## 2017-07-23 PROCEDURE — 83550 IRON BINDING TEST: CPT

## 2017-07-23 PROCEDURE — 87040 BLOOD CULTURE FOR BACTERIA: CPT

## 2017-07-23 PROCEDURE — 93005 ELECTROCARDIOGRAM TRACING: CPT

## 2017-07-23 PROCEDURE — 84100 ASSAY OF PHOSPHORUS: CPT

## 2017-07-23 PROCEDURE — 82728 ASSAY OF FERRITIN: CPT

## 2017-07-23 PROCEDURE — 81001 URINALYSIS AUTO W/SCOPE: CPT

## 2017-07-23 PROCEDURE — 85027 COMPLETE CBC AUTOMATED: CPT

## 2017-07-23 PROCEDURE — 83036 HEMOGLOBIN GLYCOSYLATED A1C: CPT

## 2017-07-23 PROCEDURE — 80053 COMPREHEN METABOLIC PANEL: CPT

## 2019-06-17 NOTE — H&P ADULT. - RESPIRATORY AND THORAX
Discharge Note  Short Stay      SUMMARY     Admit Date: 6/17/2019    Attending Physician: Chyna Valdovinos    Procedure: L5/S1 Interlaminar Epidural Steroid Injection    Discharge Physician: Chyna Valdovinos    Discharge Date: 6/17/2019 2:07 PM    Final Diagnosis: Lumbar radiculitis [M54.16]  DDD (degenerative disc disease), lumbar [M51.36]    Disposition: Home or self care    Patient Instructions:   Current Discharge Medication List      CONTINUE these medications which have NOT CHANGED    Details   multivitamin (ONE DAILY MULTIVITAMIN) per tablet Take 1 tablet by mouth once daily.      zolpidem (AMBIEN) 5 MG Tab Take 1 tablet (5 mg total) by mouth nightly as needed.  Qty: 30 tablet, Refills: 3    Associated Diagnoses: Sleep disturbance             Resume home diet and activity    
details…

## 2019-06-18 NOTE — PATIENT PROFILE ADULT. - CAREGIVER NAME
Left message for Michel Post that the order is correct as Dr. Fredis Solis is evaluating for a tumor/mass- advised to call back if questions Sophie

## 2024-06-19 NOTE — H&P ADULT. - PROBLEM SELECTOR PROBLEM 2
For more information about the Nutrition Program, contact [facility] at [phone] or via email at [email or web address].    
Essential hypertension

## 2024-10-24 NOTE — H&P ADULT. - GENITOURINARY
Daily Speech Treatment Note    Today's date: 10/24/2024   Patient’s name: Tevin Bazan  : 1943  MRN: [id number]  Safety measures:   Referring provider: Jessica Connolly MD    Encounter Diagnosis     ICD-10-CM    1. Cerebrovascular accident (CVA) due to other mechanism (HCC)  I63.89       2. Dysarthria as late effect of cerebrovascular disease  I69.922         Visit Trackin    Subjective/Behavioral:  -   Pleasant/Cooperative       Objective/Assessment:  Completed structured activities with patient to target goals below.  Results as stated below.       Short Term  Patient will perform oral motor exercises 30x each (with and without resistance) to facilitate improved strength and function for increased speech intelligibility.  10/15: Completed education on buccal and labial and lingual exercises for range of motion and strengthening. Patient able to demonstrate, provided handout. Encouraged home practice.      Strength and Endurance Testing:  The IOPI Pro is used by medical professionals to measure, evaluate, and increase the strength and endurance of the tongue and lip in patients with oral motor disorders, including dysphagia and dysarthria.  The IOPI measures the maximum pressure (Pmax) a patient can produce in an air-filled bulb when it is compressed as hard as possible by the tongue or lip against a hard surface (e.g. The palate or teeth, respectively). Pmax is a measure of strength, expressed in kilopascals (kPa, an international unit of pressure).       For patients with dysphagia or dysarthria, oral motor fatigability may be of interest.  The IOPI Pro can be used to assess tongue fatigability.  Low endurance values are an indicator of a high fatigability.  Endurance is measured with the IOPI Pro by quantifying the length of time that a patient can maintain 50% of his or her Pmax.  This procedure is conducted in Target Mode by setting the target value to 50% of the patient's Pmax and timing how  long the patient can hold the top (green) light on.       The medical professional determines what target value is appropriate for exercise therapy purposes and provides specific instructions to the patient for a particular exercise protocol.  In Target Mode, the pressure required to illuminate the green light a the top of the light array can be adjusted using the Set Target arrow buttons.  This green light is used as a visual target for the patient.     Tongue tip Peak Max after 3 trials: 46 Norm for age/gender is 56   Tongue back Peak Max after 3 trials: 52 Norm for age/gender is 50   Right lip Peak Max after 3 trials: 16 Norm for age/gender is 35   Left lip Peak Max after 3 trials: 20 Norm for age/gender is 35    Endurance Training: @ 30 MAX - HELD FOR 2 SECONDS    IOPI PEAK MEASUREMENT WEEKLY GRID  61    10/11/24 10/17          Tongue tip  (Goal = 56) 61 46          Tongue back  (Goal = 50) 35 52          Right Lip  (Goal = 35) 13 16       Left Lip  (Goal = 35) 18 20           IOPI -- Today's Exercise Targets    Tongue tip  (Goal = 56) Peak Max after 3 trials: 46 Effort level: 70% Target for treatment: 32   Tongue back  (Goal = 50) Peak Max after 3 trials: 52 Effort level: 70% Target for treatment: 36, 33 x 4, 31 x    Right Lip  (Goal = 35) Peak Max after 3 trials: 16 Effort level: 70% Target for treatment: 11   Left Lip  (Goal = 35) Peak Max after 3 trials: 20 Effort level: 70% Target for treatment: 14, 11        10/11/24 10/17/24       Tongue tip   32 x 30       Tongue back 23 x 30 36 x 7, 33 x 4, 31 x 19        Right Lip 8 x 15 11 x 30        Left Lip 12 and 11 (20) 14 x 10; 11 x 20                      Patient will utilize over-articulation 80% of the time while orally reading sentence/paragraph length material to facilitate increased speech intelligibility.  10/11: Educated on compensatory strategies to increase clarity, provided handout. 10/15: Min-mod cues to utilize overarticulation in conversation.   10/21: Reading sentences using strategies: Over 90% use of strategies in both sentences and conversation. 10/24: Reading sentences /t/ and /d/ & /n/ focused and multisyllabic words: 80% accuracy in using strategies and speaking with clarity. Increased fatigue noted with increased talking / sentence, education provided.      Patient will complete structured oral reading and conversational tasks with the consistent use of compensatory strategies >90% of the time to facilitate increased speech intelligibility.  10/21: Using overarticulation in conversation well and functionally in conversation, over 90% of the time.       Patient will perform oral motor and diadochokinetic speech rate exercises with > 90% accuracy to facilitate increased speech intelligibility.     Patient will complete pharyngeal strengthening exercises (EMST?) for increased safety & improved swallow function.        Patient will complete daily oral motor exercises (IOPI as appropriate) to increase lingual/labial range of motion, strength, and coordination with min cues to 90% effectiveness to improve bolus formation and strengthen oral musculature.   (See above)     Patient will perform compensatory strategies (e.g., upright positioning, small bites/sips, slow rate, alternation of consistencies, multiple swallows, effortful swallow, chin tuck, head turn, etc.) with 80% accuracy with minimized overt s/sx penetration/aspiration of least restrictive food/liquid consistencies.     Patient will tolerate least restrictive diet with minimized overt s/sx of penetration or aspiration.        Long Term      Patient will utilize compensatory strategies with optimum safety and efficacy of swallowing function on P.O. intake without overt s/sx of penetration or aspiration by discharge.        Patient will demonstrate independent implementation of compensatory strategies to improve speech intelligibility by discharge.      Patient will improve the ability to  facilitate functional speech production skills for intelligible communication including compensatory strategies to promote quality of life and to maximize level of independence with communication.                   Plan:  -Continue with current plan of care.    Emst-75?  Iopi? Dysarthria strategies, oral motor strategies for labial closure, lingual / labial strength, dysphagia strategies           negative